# Patient Record
Sex: FEMALE | URBAN - METROPOLITAN AREA
[De-identification: names, ages, dates, MRNs, and addresses within clinical notes are randomized per-mention and may not be internally consistent; named-entity substitution may affect disease eponyms.]

---

## 2022-05-21 ENCOUNTER — OFFICE VISIT (OUTPATIENT)
Dept: URBAN - METROPOLITAN AREA CLINIC 3 | Facility: CLINIC | Age: 47
End: 2022-05-21
Payer: COMMERCIAL

## 2022-05-21 DIAGNOSIS — H31.102 CHOROIDAL DEGENERATION, UNSPECIFIED, LEFT EYE: ICD-10-CM

## 2022-05-21 DIAGNOSIS — H11.153 PINGUECULA, BILATERAL: ICD-10-CM

## 2022-05-21 DIAGNOSIS — H04.123 DRY EYE SYNDROME OF BILATERAL LACRIMAL GLANDS: ICD-10-CM

## 2022-05-21 DIAGNOSIS — H18.623 KERATOCONUS, UNSTABLE, BILATERAL: Primary | ICD-10-CM

## 2022-05-21 DIAGNOSIS — H43.313 VITREOUS MEMBRANES AND STRANDS, BILATERAL: ICD-10-CM

## 2022-05-21 PROCEDURE — 92133 CPTRZD OPH DX IMG PST SGM ON: CPT | Performed by: OPTOMETRIST

## 2022-05-21 PROCEDURE — 92004 COMPRE OPH EXAM NEW PT 1/>: CPT | Performed by: OPTOMETRIST

## 2022-05-21 PROCEDURE — 92250 FUNDUS PHOTOGRAPHY W/I&R: CPT | Performed by: OPTOMETRIST

## 2022-05-21 PROCEDURE — 92025 CPTRIZED CORNEAL TOPOGRAPHY: CPT | Performed by: OPTOMETRIST

## 2022-05-21 ASSESSMENT — INTRAOCULAR PRESSURE
OS: 8
OD: 9

## 2022-05-21 ASSESSMENT — KERATOMETRY
OS: 44.13
OD: 50.25

## 2022-05-21 NOTE — IMPRESSION/PLAN
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 Plan: Fundus Photos take both. Patient having CT scan of head and orbit scheduled 5/31/2022. F/U with PCP as scheduled. MOCT confirms no edema or distortion both eyes.

## 2022-05-21 NOTE — IMPRESSION/PLAN
Impression: Keratoconus, unstable, bilateral: B72.043. Plan: Unstable per history. Unable tolerate contacts per patient. Patient wanting corneal crosslinking. Will schedule with Dr. Luther Zhong for evaluation to repeat topography and evaluate for crosslinking.

## 2022-06-22 ENCOUNTER — OFFICE VISIT (OUTPATIENT)
Dept: URBAN - METROPOLITAN AREA CLINIC 3 | Facility: CLINIC | Age: 47
End: 2022-06-22
Payer: COMMERCIAL

## 2022-06-22 DIAGNOSIS — H18.623 KERATOCONUS, UNSTABLE, BILATERAL: Primary | ICD-10-CM

## 2022-06-22 PROCEDURE — 92025 CPTRIZED CORNEAL TOPOGRAPHY: CPT | Performed by: OPHTHALMOLOGY

## 2022-06-22 PROCEDURE — 92012 INTRM OPH EXAM EST PATIENT: CPT | Performed by: OPHTHALMOLOGY

## 2022-06-22 ASSESSMENT — INTRAOCULAR PRESSURE
OD: 8
OS: 7

## 2022-06-22 ASSESSMENT — KERATOMETRY
OS: 51.00
OD: 50.13

## 2022-06-22 NOTE — IMPRESSION/PLAN
Impression: Keratoconus, unstable, bilateral: L41.828. Plan: Keratoconus- Confirmed with repeat pentacam which shows posterior steepening that is worse compared to the last Pentacam. Manifest refraction today shows more than 1D of myopic progression in the right eye. Also the patient notices a rapid worsening of the vision. Recommended to proceed with collagen cross linking in the both eyes starting with the Right eye first. R/B/A discussed with patient. Advised to discontinue eye rubbing and avoiding external pressure to eyes while sleeping even after procedure.

## 2024-05-09 ENCOUNTER — OFFICE VISIT (OUTPATIENT)
Facility: LOCATION | Age: 49
End: 2024-05-09
Payer: COMMERCIAL

## 2024-05-09 DIAGNOSIS — H04.123 DRY EYE SYNDROME OF BILATERAL LACRIMAL GLANDS: Primary | ICD-10-CM

## 2024-05-09 DIAGNOSIS — H18.613 KERATOCONUS, STABLE, BILATERAL: ICD-10-CM

## 2024-05-09 PROCEDURE — 92025 CPTRIZED CORNEAL TOPOGRAPHY: CPT | Performed by: OPHTHALMOLOGY

## 2024-05-09 PROCEDURE — 92014 COMPRE OPH EXAM EST PT 1/>: CPT | Performed by: OPHTHALMOLOGY

## 2024-05-09 PROCEDURE — 76514 ECHO EXAM OF EYE THICKNESS: CPT | Performed by: OPHTHALMOLOGY

## 2024-05-09 ASSESSMENT — KERATOMETRY
OS: 50.37
OD: 49.24

## 2024-05-09 ASSESSMENT — VISUAL ACUITY
OD: 20/50
OS: 20/70

## 2024-05-09 ASSESSMENT — INTRAOCULAR PRESSURE
OS: 14
OD: 13

## 2024-11-14 ENCOUNTER — OFFICE VISIT (OUTPATIENT)
Facility: LOCATION | Age: 49
End: 2024-11-14
Payer: COMMERCIAL

## 2024-11-14 DIAGNOSIS — H18.611 KERATOCONUS, STABLE, RIGHT EYE: Primary | ICD-10-CM

## 2024-11-14 DIAGNOSIS — H18.622 KERATOCONUS, UNSTABLE, LEFT EYE: ICD-10-CM

## 2024-11-14 PROCEDURE — 92025 CPTRIZED CORNEAL TOPOGRAPHY: CPT | Performed by: OPHTHALMOLOGY

## 2024-11-14 PROCEDURE — 92012 INTRM OPH EXAM EST PATIENT: CPT | Performed by: OPHTHALMOLOGY

## 2024-11-14 RX ORDER — PREDNISOLONE ACETATE 10 MG/ML
1 % SUSPENSION/ DROPS OPHTHALMIC
Qty: 10 | Refills: 1 | Status: ACTIVE
Start: 2024-11-14

## 2024-11-14 RX ORDER — TIMOLOL MALEATE 5 MG/ML
0.5 % SOLUTION/ DROPS OPHTHALMIC
Qty: 10 | Refills: 0 | Status: ACTIVE
Start: 2024-11-14

## 2024-11-14 RX ORDER — MOXIFLOXACIN 5 MG/ML
0.5 % SOLUTION/ DROPS OPHTHALMIC
Qty: 3 | Refills: 1 | Status: ACTIVE
Start: 2024-11-14

## 2024-11-14 RX ORDER — ACYCLOVIR 400 MG/1
400 MG TABLET ORAL
Qty: 81 | Refills: 0 | Status: ACTIVE
Start: 2024-11-14

## 2024-11-14 ASSESSMENT — INTRAOCULAR PRESSURE
OD: 15
OS: 15

## 2024-11-14 ASSESSMENT — VISUAL ACUITY
OD: 20/40
OS: 20/100